# Patient Record
Sex: FEMALE | Race: BLACK OR AFRICAN AMERICAN
[De-identification: names, ages, dates, MRNs, and addresses within clinical notes are randomized per-mention and may not be internally consistent; named-entity substitution may affect disease eponyms.]

---

## 2021-03-04 ENCOUNTER — HOSPITAL ENCOUNTER (EMERGENCY)
Dept: HOSPITAL 8 - ED | Age: 31
Discharge: HOME | End: 2021-03-04
Payer: COMMERCIAL

## 2021-03-04 VITALS — WEIGHT: 132.28 LBS | HEIGHT: 67 IN | BODY MASS INDEX: 20.76 KG/M2

## 2021-03-04 VITALS — SYSTOLIC BLOOD PRESSURE: 100 MMHG | DIASTOLIC BLOOD PRESSURE: 57 MMHG

## 2021-03-04 DIAGNOSIS — O21.1: Primary | ICD-10-CM

## 2021-03-04 DIAGNOSIS — Z3A.08: ICD-10-CM

## 2021-03-04 LAB
ALBUMIN SERPL-MCNC: 4 G/DL (ref 3.4–5)
ALP SERPL-CCNC: 49 U/L (ref 45–117)
ALT SERPL-CCNC: 16 U/L (ref 12–78)
ANION GAP SERPL CALC-SCNC: 7 MMOL/L (ref 5–15)
BASOPHILS # BLD AUTO: 0 X10^3/UL (ref 0–0.1)
BASOPHILS NFR BLD AUTO: 0 % (ref 0–1)
BILIRUB SERPL-MCNC: 0.5 MG/DL (ref 0.2–1)
CALCIUM SERPL-MCNC: 8.9 MG/DL (ref 8.5–10.1)
CHLORIDE SERPL-SCNC: 104 MMOL/L (ref 98–107)
CREAT SERPL-MCNC: 0.66 MG/DL (ref 0.55–1.02)
EOSINOPHIL # BLD AUTO: 0.1 X10^3/UL (ref 0–0.4)
EOSINOPHIL NFR BLD AUTO: 2 % (ref 1–7)
ERYTHROCYTE [DISTWIDTH] IN BLOOD BY AUTOMATED COUNT: 14.4 % (ref 9.6–15.2)
LYMPHOCYTES # BLD AUTO: 1.9 X10^3/UL (ref 1–3.4)
LYMPHOCYTES NFR BLD AUTO: 23 % (ref 22–44)
MCH RBC QN AUTO: 27.7 PG (ref 27–34.8)
MCHC RBC AUTO-ENTMCNC: 32.6 G/DL (ref 32.4–35.8)
MD: NO
MICROSCOPIC: (no result)
MONOCYTES # BLD AUTO: 0.5 X10^3/UL (ref 0.2–0.8)
MONOCYTES NFR BLD AUTO: 6 % (ref 2–9)
NEUTROPHILS # BLD AUTO: 5.9 X10^3/UL (ref 1.8–6.8)
NEUTROPHILS NFR BLD AUTO: 70 % (ref 42–75)
PLATELET # BLD AUTO: 207 X10^3/UL (ref 130–400)
PMV BLD AUTO: 9.7 FL (ref 7.4–10.4)
PROT SERPL-MCNC: 7.6 G/DL (ref 6.4–8.2)
RBC # BLD AUTO: 4.3 X10^6/UL (ref 3.82–5.3)

## 2021-03-04 PROCEDURE — 84702 CHORIONIC GONADOTROPIN TEST: CPT

## 2021-03-04 PROCEDURE — 36415 COLL VENOUS BLD VENIPUNCTURE: CPT

## 2021-03-04 PROCEDURE — 81001 URINALYSIS AUTO W/SCOPE: CPT

## 2021-03-04 PROCEDURE — 87086 URINE CULTURE/COLONY COUNT: CPT

## 2021-03-04 PROCEDURE — 80053 COMPREHEN METABOLIC PANEL: CPT

## 2021-03-04 PROCEDURE — 96361 HYDRATE IV INFUSION ADD-ON: CPT

## 2021-03-04 PROCEDURE — 85025 COMPLETE CBC W/AUTO DIFF WBC: CPT

## 2021-03-04 PROCEDURE — 76801 OB US < 14 WKS SINGLE FETUS: CPT

## 2021-03-04 PROCEDURE — 86901 BLOOD TYPING SEROLOGIC RH(D): CPT

## 2021-03-04 PROCEDURE — 99285 EMERGENCY DEPT VISIT HI MDM: CPT

## 2021-03-04 PROCEDURE — 83690 ASSAY OF LIPASE: CPT

## 2021-03-04 PROCEDURE — 96374 THER/PROPH/DIAG INJ IV PUSH: CPT

## 2021-03-04 NOTE — NUR
PT MEDICATED PER EMAR. PT STATES NOT WANTING PHENERGAN SHOT AT THIS TIME. PT 
MEDICATED WITH ZOFRAN AND IV FLUIDS RUNNING AT THIS TIME

## 2021-10-05 NOTE — H&P
DATE OF ADMISSION:  10/11/2021     REASON FOR ADMISSION:  Postdates induction of labor.     HISTORY OF PRESENT ILLNESS:  This is a 31-year-old  2, para 0,   therapeutic  1 at 40 and 4/7th weeks' gestation based on last   menstrual period consistent with a 10-week ultrasound, who is electing to   proceed forward with an induction of labor with Pitocin per protocol.  Risks,   benefits and alternatives have been addressed.  She has asked appropriate   questions, signed the appropriate consents and is wishing to proceed forward   with delivery as planned.     PAST MEDICAL HISTORY:  Migraine headaches, nephrolithiasis.     PAST SURGICAL HISTORY:  Tonsillectomy in .     OBSTETRICAL HISTORY:  One therapeutic .  This is her second pregnancy.     SOCIAL HISTORY:  She denies use of any alcohol, tobacco or recreational drug   use.     MEDICATIONS:  Prenatal vitamins.     ALLERGIES:  No known drug allergies.     PHYSICAL EXAMINATION:    VITAL SIGNS:  She is afebrile, hemodynamically stable.  Current vital signs   can be seen in electronic medical record.  HEART:  Regular rate and rhythm.  CHEST:  Clear to auscultation bilaterally.  ABDOMEN:  Soft, gravid, nontender.  PELVIC:  Sterile vaginal exam 1, 25%, high and posterior.  EXTREMITIES:  Nontender.     LABORATORY DATA:  Prenatal care labs are all in order.  She is group B strep   negative.  She is also anemic, on iron sulfate, last hemoglobin and hematocrit   10.1 and 31.1.     ASSESSMENT AND PLAN:  A 31-year-old  2, para 0 at 40 and 4/7th weeks   gestation, electing to proceed forward with Pitocin per protocol.        ______________________________  MD MARY Solo/KAUSHIK/TIMOTHY    DD:  10/04/2021 18:12  DT:  10/04/2021 18:36    Job#:  [id number]

## 2021-10-09 ENCOUNTER — HOSPITAL ENCOUNTER (OUTPATIENT)
Dept: OBGYN | Facility: MEDICAL CENTER | Age: 31
End: 2021-10-09
Attending: SPECIALIST
Payer: COMMERCIAL

## 2021-10-09 PROCEDURE — U0003 INFECTIOUS AGENT DETECTION BY NUCLEIC ACID (DNA OR RNA); SEVERE ACUTE RESPIRATORY SYNDROME CORONAVIRUS 2 (SARS-COV-2) (CORONAVIRUS DISEASE [COVID-19]), AMPLIFIED PROBE TECHNIQUE, MAKING USE OF HIGH THROUGHPUT TECHNOLOGIES AS DESCRIBED BY CMS-2020-01-R: HCPCS

## 2021-10-09 PROCEDURE — U0005 INFEC AGEN DETEC AMPLI PROBE: HCPCS

## 2021-10-10 LAB
SARS-COV-2 RNA RESP QL NAA+PROBE: NOTDETECTED
SPECIMEN SOURCE: NORMAL

## 2021-10-11 ENCOUNTER — ANESTHESIA EVENT (OUTPATIENT)
Dept: ANESTHESIOLOGY | Facility: MEDICAL CENTER | Age: 31
End: 2021-10-11
Payer: COMMERCIAL

## 2021-10-11 ENCOUNTER — HOSPITAL ENCOUNTER (INPATIENT)
Facility: MEDICAL CENTER | Age: 31
LOS: 2 days | End: 2021-10-13
Attending: SPECIALIST | Admitting: SPECIALIST
Payer: COMMERCIAL

## 2021-10-11 ENCOUNTER — ANESTHESIA (OUTPATIENT)
Dept: ANESTHESIOLOGY | Facility: MEDICAL CENTER | Age: 31
End: 2021-10-11
Payer: COMMERCIAL

## 2021-10-11 LAB
BASOPHILS # BLD AUTO: 0.3 % (ref 0–1.8)
BASOPHILS # BLD: 0.05 K/UL (ref 0–0.12)
EOSINOPHIL # BLD AUTO: 0.03 K/UL (ref 0–0.51)
EOSINOPHIL NFR BLD: 0.2 % (ref 0–6.9)
ERYTHROCYTE [DISTWIDTH] IN BLOOD BY AUTOMATED COUNT: 46.6 FL (ref 35.9–50)
HCT VFR BLD AUTO: 35.4 % (ref 37–47)
HGB BLD-MCNC: 11.5 G/DL (ref 12–16)
HOLDING TUBE BB 8507: NORMAL
IMM GRANULOCYTES # BLD AUTO: 0.07 K/UL (ref 0–0.11)
IMM GRANULOCYTES NFR BLD AUTO: 0.5 % (ref 0–0.9)
LYMPHOCYTES # BLD AUTO: 1.45 K/UL (ref 1–4.8)
LYMPHOCYTES NFR BLD: 9.4 % (ref 22–41)
MCH RBC QN AUTO: 26.2 PG (ref 27–33)
MCHC RBC AUTO-ENTMCNC: 32.5 G/DL (ref 33.6–35)
MCV RBC AUTO: 80.6 FL (ref 81.4–97.8)
MONOCYTES # BLD AUTO: 0.33 K/UL (ref 0–0.85)
MONOCYTES NFR BLD AUTO: 2.1 % (ref 0–13.4)
NEUTROPHILS # BLD AUTO: 13.46 K/UL (ref 2–7.15)
NEUTROPHILS NFR BLD: 87.5 % (ref 44–72)
NRBC # BLD AUTO: 0 K/UL
NRBC BLD-RTO: 0 /100 WBC
PLATELET # BLD AUTO: 264 K/UL (ref 164–446)
PMV BLD AUTO: 11.6 FL (ref 9–12.9)
RBC # BLD AUTO: 4.39 M/UL (ref 4.2–5.4)
WBC # BLD AUTO: 15.4 K/UL (ref 4.8–10.8)

## 2021-10-11 PROCEDURE — 36415 COLL VENOUS BLD VENIPUNCTURE: CPT

## 2021-10-11 PROCEDURE — 700111 HCHG RX REV CODE 636 W/ 250 OVERRIDE (IP): Performed by: ANESTHESIOLOGY

## 2021-10-11 PROCEDURE — 700111 HCHG RX REV CODE 636 W/ 250 OVERRIDE (IP): Performed by: SPECIALIST

## 2021-10-11 PROCEDURE — 0UQMXZZ REPAIR VULVA, EXTERNAL APPROACH: ICD-10-PCS | Performed by: SPECIALIST

## 2021-10-11 PROCEDURE — 700101 HCHG RX REV CODE 250: Performed by: ANESTHESIOLOGY

## 2021-10-11 PROCEDURE — 302449 STATCHG TRIAGE ONLY (STATISTIC)

## 2021-10-11 PROCEDURE — 700105 HCHG RX REV CODE 258: Performed by: SPECIALIST

## 2021-10-11 PROCEDURE — 10907ZC DRAINAGE OF AMNIOTIC FLUID, THERAPEUTIC FROM PRODUCTS OF CONCEPTION, VIA NATURAL OR ARTIFICIAL OPENING: ICD-10-PCS | Performed by: SPECIALIST

## 2021-10-11 PROCEDURE — 304965 HCHG RECOVERY SERVICES

## 2021-10-11 PROCEDURE — 770002 HCHG ROOM/CARE - OB PRIVATE (112)

## 2021-10-11 PROCEDURE — 85025 COMPLETE CBC W/AUTO DIFF WBC: CPT

## 2021-10-11 PROCEDURE — 59409 OBSTETRICAL CARE: CPT

## 2021-10-11 RX ORDER — MISOPROSTOL 200 UG/1
800 TABLET ORAL
Status: DISCONTINUED | OUTPATIENT
Start: 2021-10-11 | End: 2021-10-12 | Stop reason: HOSPADM

## 2021-10-11 RX ORDER — ALUMINA, MAGNESIA, AND SIMETHICONE 2400; 2400; 240 MG/30ML; MG/30ML; MG/30ML
30 SUSPENSION ORAL EVERY 6 HOURS PRN
Status: DISCONTINUED | OUTPATIENT
Start: 2021-10-11 | End: 2021-10-12 | Stop reason: HOSPADM

## 2021-10-11 RX ORDER — ONDANSETRON 4 MG/1
4 TABLET, ORALLY DISINTEGRATING ORAL EVERY 6 HOURS PRN
Status: DISCONTINUED | OUTPATIENT
Start: 2021-10-11 | End: 2021-10-13 | Stop reason: HOSPADM

## 2021-10-11 RX ORDER — LIDOCAINE HYDROCHLORIDE AND EPINEPHRINE 15; 5 MG/ML; UG/ML
INJECTION, SOLUTION EPIDURAL PRN
Status: DISCONTINUED | OUTPATIENT
Start: 2021-10-11 | End: 2021-10-11 | Stop reason: SURG

## 2021-10-11 RX ORDER — ONDANSETRON 2 MG/ML
4 INJECTION INTRAMUSCULAR; INTRAVENOUS EVERY 6 HOURS PRN
Status: DISCONTINUED | OUTPATIENT
Start: 2021-10-11 | End: 2021-10-13 | Stop reason: HOSPADM

## 2021-10-11 RX ORDER — ROPIVACAINE HYDROCHLORIDE 2 MG/ML
INJECTION, SOLUTION EPIDURAL; INFILTRATION PRN
Status: DISCONTINUED | OUTPATIENT
Start: 2021-10-11 | End: 2021-10-12 | Stop reason: HOSPADM

## 2021-10-11 RX ORDER — DEXTROSE, SODIUM CHLORIDE, SODIUM LACTATE, POTASSIUM CHLORIDE, AND CALCIUM CHLORIDE 5; .6; .31; .03; .02 G/100ML; G/100ML; G/100ML; G/100ML; G/100ML
INJECTION, SOLUTION INTRAVENOUS CONTINUOUS
Status: DISCONTINUED | OUTPATIENT
Start: 2021-10-11 | End: 2021-10-13 | Stop reason: HOSPADM

## 2021-10-11 RX ORDER — SODIUM CHLORIDE, SODIUM LACTATE, POTASSIUM CHLORIDE, CALCIUM CHLORIDE 600; 310; 30; 20 MG/100ML; MG/100ML; MG/100ML; MG/100ML
INJECTION, SOLUTION INTRAVENOUS CONTINUOUS
Status: ACTIVE | OUTPATIENT
Start: 2021-10-11 | End: 2021-10-11

## 2021-10-11 RX ORDER — SODIUM CHLORIDE, SODIUM LACTATE, POTASSIUM CHLORIDE, AND CALCIUM CHLORIDE .6; .31; .03; .02 G/100ML; G/100ML; G/100ML; G/100ML
250 INJECTION, SOLUTION INTRAVENOUS PRN
Status: DISCONTINUED | OUTPATIENT
Start: 2021-10-11 | End: 2021-10-12 | Stop reason: HOSPADM

## 2021-10-11 RX ORDER — ROPIVACAINE HYDROCHLORIDE 2 MG/ML
INJECTION, SOLUTION EPIDURAL; INFILTRATION; PERINEURAL CONTINUOUS
Status: DISCONTINUED | OUTPATIENT
Start: 2021-10-11 | End: 2021-10-11

## 2021-10-11 RX ORDER — ROPIVACAINE HYDROCHLORIDE 2 MG/ML
INJECTION, SOLUTION EPIDURAL; INFILTRATION; PERINEURAL CONTINUOUS
Status: DISCONTINUED | OUTPATIENT
Start: 2021-10-11 | End: 2021-10-12

## 2021-10-11 RX ORDER — SODIUM CHLORIDE, SODIUM LACTATE, POTASSIUM CHLORIDE, AND CALCIUM CHLORIDE .6; .31; .03; .02 G/100ML; G/100ML; G/100ML; G/100ML
1000 INJECTION, SOLUTION INTRAVENOUS
Status: DISCONTINUED | OUTPATIENT
Start: 2021-10-11 | End: 2021-10-12 | Stop reason: HOSPADM

## 2021-10-11 RX ADMIN — LIDOCAINE HYDROCHLORIDE,EPINEPHRINE BITARTRATE 5 ML: 15; .005 INJECTION, SOLUTION EPIDURAL; INFILTRATION; INTRACAUDAL; PERINEURAL at 17:18

## 2021-10-11 RX ADMIN — ROPIVACAINE HYDROCHLORIDE: 2 INJECTION, SOLUTION EPIDURAL; INFILTRATION at 17:21

## 2021-10-11 RX ADMIN — SODIUM CHLORIDE, POTASSIUM CHLORIDE, SODIUM LACTATE AND CALCIUM CHLORIDE: 600; 310; 30; 20 INJECTION, SOLUTION INTRAVENOUS at 16:05

## 2021-10-11 RX ADMIN — OXYTOCIN 125 ML/HR: 10 INJECTION, SOLUTION INTRAMUSCULAR; INTRAVENOUS at 23:40

## 2021-10-11 RX ADMIN — ROPIVACAINE HYDROCHLORIDE 10 ML: 2 INJECTION, SOLUTION EPIDURAL; INFILTRATION at 17:21

## 2021-10-11 RX ADMIN — OXYTOCIN 2 MILLI-UNITS/MIN: 10 INJECTION, SOLUTION INTRAMUSCULAR; INTRAVENOUS at 19:37

## 2021-10-11 ASSESSMENT — COPD QUESTIONNAIRES
COPD SCREENING SCORE: 0
HAVE YOU SMOKED AT LEAST 100 CIGARETTES IN YOUR ENTIRE LIFE: NO/DON'T KNOW
DO YOU EVER COUGH UP ANY MUCUS OR PHLEGM?: NO/ONLY WITH OCCASIONAL COLDS OR INFECTIONS
IN THE PAST 12 MONTHS DO YOU DO LESS THAN YOU USED TO BECAUSE OF YOUR BREATHING PROBLEMS: DISAGREE/UNSURE
DURING THE PAST 4 WEEKS HOW MUCH DID YOU FEEL SHORT OF BREATH: NONE/LITTLE OF THE TIME

## 2021-10-11 ASSESSMENT — LIFESTYLE VARIABLES
TOTAL SCORE: 0
HAVE PEOPLE ANNOYED YOU BY CRITICIZING YOUR DRINKING: NO
EVER_SMOKED: NEVER
HAVE YOU EVER FELT YOU SHOULD CUT DOWN ON YOUR DRINKING: NO
TOTAL SCORE: 0
DOES PATIENT WANT TO STOP DRINKING: NO
CONSUMPTION TOTAL: INCOMPLETE
EVER FELT BAD OR GUILTY ABOUT YOUR DRINKING: NO
EVER HAD A DRINK FIRST THING IN THE MORNING TO STEADY YOUR NERVES TO GET RID OF A HANGOVER: NO
ALCOHOL_USE: NO
TOTAL SCORE: 0

## 2021-10-11 ASSESSMENT — PAIN DESCRIPTION - PAIN TYPE
TYPE: ACUTE PAIN
TYPE: ACUTE PAIN

## 2021-10-11 ASSESSMENT — PATIENT HEALTH QUESTIONNAIRE - PHQ9
2. FEELING DOWN, DEPRESSED, IRRITABLE, OR HOPELESS: NOT AT ALL
SUM OF ALL RESPONSES TO PHQ9 QUESTIONS 1 AND 2: 0
1. LITTLE INTEREST OR PLEASURE IN DOING THINGS: NOT AT ALL

## 2021-10-11 NOTE — PROGRESS NOTES
Patient comes in with concerns that she is having contractions.  She denies leaking or bleeding and she feels fetal movement.  SVE is 4/90/-2.  Dr Chacon called.  Patient to be rechecked in an hour.  Patient rechecked and is 5/100/-2 with a bbow.  Dr Chacon called.  Patient to be admitted.  Report given to Kinza SALAS.

## 2021-10-11 NOTE — ANESTHESIA PREPROCEDURE EVALUATION
Relevant Problems   No relevant active problems   Term pregnancy, labor pain    Physical Exam    Airway   Mallampati: II  TM distance: >3 FB  Neck ROM: full       Cardiovascular - normal exam  Rhythm: regular  Rate: normal  (-) murmur     Dental - normal exam           Pulmonary - normal exam  Breath sounds clear to auscultation     Abdominal    Neurological - normal exam                 Anesthesia Plan    ASA 2       Plan - epidural   Neuraxial block will be labor analgesia                  Pertinent diagnostic labs and testing reviewed    Informed Consent:    Anesthetic plan and risks discussed with patient.

## 2021-10-12 LAB
ERYTHROCYTE [DISTWIDTH] IN BLOOD BY AUTOMATED COUNT: 47.3 FL (ref 35.9–50)
HCT VFR BLD AUTO: 31.8 % (ref 37–47)
HGB BLD-MCNC: 9.9 G/DL (ref 12–16)
MCH RBC QN AUTO: 25.6 PG (ref 27–33)
MCHC RBC AUTO-ENTMCNC: 31.1 G/DL (ref 33.6–35)
MCV RBC AUTO: 82.2 FL (ref 81.4–97.8)
PLATELET # BLD AUTO: 219 K/UL (ref 164–446)
PMV BLD AUTO: 11.9 FL (ref 9–12.9)
RBC # BLD AUTO: 3.87 M/UL (ref 4.2–5.4)
WBC # BLD AUTO: 14.4 K/UL (ref 4.8–10.8)

## 2021-10-12 PROCEDURE — 770002 HCHG ROOM/CARE - OB PRIVATE (112)

## 2021-10-12 PROCEDURE — 700102 HCHG RX REV CODE 250 W/ 637 OVERRIDE(OP): Performed by: SPECIALIST

## 2021-10-12 PROCEDURE — A9270 NON-COVERED ITEM OR SERVICE: HCPCS | Performed by: SPECIALIST

## 2021-10-12 PROCEDURE — 36415 COLL VENOUS BLD VENIPUNCTURE: CPT

## 2021-10-12 PROCEDURE — 85027 COMPLETE CBC AUTOMATED: CPT

## 2021-10-12 PROCEDURE — 303615 HCHG EPIDURAL/SPINAL ANESTHESIA FOR LABOR

## 2021-10-12 RX ORDER — BISACODYL 10 MG
10 SUPPOSITORY, RECTAL RECTAL PRN
Status: DISCONTINUED | OUTPATIENT
Start: 2021-10-12 | End: 2021-10-13 | Stop reason: HOSPADM

## 2021-10-12 RX ORDER — IBUPROFEN 600 MG/1
600 TABLET ORAL EVERY 6 HOURS PRN
Status: DISCONTINUED | OUTPATIENT
Start: 2021-10-12 | End: 2021-10-13 | Stop reason: HOSPADM

## 2021-10-12 RX ORDER — VITAMIN A ACETATE, BETA CAROTENE, ASCORBIC ACID, CHOLECALCIFEROL, .ALPHA.-TOCOPHEROL ACETATE, DL-, THIAMINE MONONITRATE, RIBOFLAVIN, NIACINAMIDE, PYRIDOXINE HYDROCHLORIDE, FOLIC ACID, CYANOCOBALAMIN, CALCIUM CARBONATE, FERROUS FUMARATE, ZINC OXIDE, CUPRIC OXIDE 3080; 12; 120; 400; 1; 1.84; 3; 20; 22; 920; 25; 200; 27; 10; 2 [IU]/1; UG/1; MG/1; [IU]/1; MG/1; MG/1; MG/1; MG/1; MG/1; [IU]/1; MG/1; MG/1; MG/1; MG/1; MG/1
1 TABLET, FILM COATED ORAL
Status: DISCONTINUED | OUTPATIENT
Start: 2021-10-12 | End: 2021-10-13 | Stop reason: HOSPADM

## 2021-10-12 RX ORDER — OXYCODONE HYDROCHLORIDE AND ACETAMINOPHEN 5; 325 MG/1; MG/1
1 TABLET ORAL EVERY 4 HOURS PRN
Status: DISCONTINUED | OUTPATIENT
Start: 2021-10-12 | End: 2021-10-13 | Stop reason: HOSPADM

## 2021-10-12 RX ORDER — CARBOPROST TROMETHAMINE 250 UG/ML
250 INJECTION, SOLUTION INTRAMUSCULAR
Status: DISCONTINUED | OUTPATIENT
Start: 2021-10-12 | End: 2021-10-13 | Stop reason: HOSPADM

## 2021-10-12 RX ORDER — SODIUM CHLORIDE, SODIUM LACTATE, POTASSIUM CHLORIDE, CALCIUM CHLORIDE 600; 310; 30; 20 MG/100ML; MG/100ML; MG/100ML; MG/100ML
INJECTION, SOLUTION INTRAVENOUS PRN
Status: DISCONTINUED | OUTPATIENT
Start: 2021-10-12 | End: 2021-10-13 | Stop reason: HOSPADM

## 2021-10-12 RX ORDER — METHYLERGONOVINE MALEATE 0.2 MG/ML
0.2 INJECTION INTRAVENOUS
Status: DISCONTINUED | OUTPATIENT
Start: 2021-10-12 | End: 2021-10-13 | Stop reason: HOSPADM

## 2021-10-12 RX ORDER — DOCUSATE SODIUM 100 MG/1
100 CAPSULE, LIQUID FILLED ORAL 2 TIMES DAILY PRN
Status: DISCONTINUED | OUTPATIENT
Start: 2021-10-12 | End: 2021-10-13 | Stop reason: HOSPADM

## 2021-10-12 RX ORDER — MISOPROSTOL 200 UG/1
600 TABLET ORAL
Status: DISCONTINUED | OUTPATIENT
Start: 2021-10-12 | End: 2021-10-13 | Stop reason: HOSPADM

## 2021-10-12 RX ADMIN — IBUPROFEN 600 MG: 600 TABLET ORAL at 08:27

## 2021-10-12 RX ADMIN — IBUPROFEN 600 MG: 600 TABLET ORAL at 01:13

## 2021-10-12 RX ADMIN — OXYCODONE HYDROCHLORIDE AND ACETAMINOPHEN 1 TABLET: 5; 325 TABLET ORAL at 19:27

## 2021-10-12 RX ADMIN — OXYCODONE HYDROCHLORIDE AND ACETAMINOPHEN 1 TABLET: 5; 325 TABLET ORAL at 11:01

## 2021-10-12 RX ADMIN — IBUPROFEN 600 MG: 600 TABLET ORAL at 19:27

## 2021-10-12 RX ADMIN — PRENATAL WITH FERROUS FUM AND FOLIC ACID 1 TABLET: 3080; 920; 120; 400; 22; 1.84; 3; 20; 10; 1; 12; 200; 27; 25; 2 TABLET ORAL at 08:26

## 2021-10-12 ASSESSMENT — PAIN DESCRIPTION - PAIN TYPE
TYPE: ACUTE PAIN

## 2021-10-12 ASSESSMENT — PAIN SCALES - GENERAL: PAIN_LEVEL: 0

## 2021-10-12 NOTE — PROGRESS NOTES
Progress Note    Subjective:   Doing well. Comfortable with the PALLAVI    Objective Data:  Recent Labs     10/11/21  1605   WBC 15.4*   RBC 4.39   HEMOGLOBIN 11.5*   HEMATOCRIT 35.4*   MCV 80.6*   MCH 26.2*   MCHC 32.5*   RDW 46.6   PLATELETCT 264   MPV 11.6           Vitals:    10/11/21 1726 10/11/21 1727 10/11/21 1729 10/11/21 1735   BP:  116/71  108/66   Pulse: 92 84 92 89   Resp:       Temp:       TempSrc:       SpO2:   100%    Weight:       Height:         ABdomen: soft gravid non tender  SVE: 9cm/C/+1  Vtx  Ext: non tender calves    FHTs: 140s with GBTBV during exam  [place]: q 2 min   No intake or output data in the 24 hours ending 10/11/21 2105    Current Facility-Administered Medications   Medication Dose Route Frequency Provider Last Rate Last Admin   • LR infusion   Intravenous Continuous Miky Ferguson M.D. 125 mL/hr at 10/11/21 1605 New Bag at 10/11/21 1605   • fentaNYL (SUBLIMAZE) injection 50 mcg  50 mcg Intravenous Q HOUR PRN Miky Ferguson M.D.       • D5LR infusion   Intravenous Continuous Miky Ferguson M.D.       • fentaNYL (SUBLIMAZE) injection 100 mcg  100 mcg Intravenous Q HOUR PRN Miky Ferguson M.D.       • mag hydrox-al hydrox-simeth (MAALOX PLUS ES or MYLANTA DS) suspension 30 mL  30 mL Oral Q6HRS PRN Miky Ferguson M.D.       • ondansetron (ZOFRAN ODT) dispertab 4 mg  4 mg Oral Q6HRS PRN Miky Ferguson M.D.        Or   • ondansetron (ZOFRAN) syringe/vial injection 4 mg  4 mg Intravenous Q6HRS PRN Miky Ferguson M.D.       • miSOPROStol (CYTOTEC) tablet 800 mcg  800 mcg Rectal Once PRN Miky Ferguson M.D.       • lactated ringers (BOLUS) infusion  1,000 mL Intravenous Once PRN James Dutton M.D.       • ropivacaine 0.2 % (NAROPIN) injection   Epidural Continuous James Dutton M.D.       • ePHEDrine injection 5 mg  5 mg Intravenous Q5 MIN PRN James Dutton M.D.        And   • lactated ringers infusion (BOLUS)  250 mL Intravenous PRN James Dutton M.D.       • oxytocin  (PITOCIN) 20 UNITS/1000ML LR (induction of labor)  0.5-20 nancy-units/min Intravenous Continuous Miky Ferguson M.D. 6 mL/hr at 10/11/21 1937 2 nancy-units/min at 10/11/21 193     Facility-Administered Medications Ordered in Other Encounters   Medication Dose Route Frequency Provider Last Rate Last Admin   • lidocaine-epinephrine 1.5 %-1:[id number] injection   Epidural PRN James Dutton M.D.   5 mL at 10/11/21 1718   • ropivacaine (NAROPIN) injection   Epidural PRN James Dutton M.D.   10 mL at 10/11/21 1721       A/P 30 yo  at term with overall reassuring fetal status now wishing to proceed with Pitocin augmentation after counseling given protracted labor course. All questions were answered.

## 2021-10-12 NOTE — ANESTHESIA TIME REPORT
Anesthesia Start and Stop Event Times     Date Time Event    10/11/2021 1710 Ready for Procedure     1712 Anesthesia Start     2238 Anesthesia Stop        Responsible Staff  10/11/21    Name Role Begin End    James Dutton M.D. Anesth 1712 2238        Preop Diagnosis (Free Text):  Pre-op Diagnosis             Preop Diagnosis (Codes):    Premium Reason  A. 3PM - 7AM    Comments:

## 2021-10-12 NOTE — PROGRESS NOTES
Report received from JOSUE Encarnacion. Assumed care of patient. Patient currently breastfeeding infant skin to skin in bed. Family at bedside.     0054- Patient transferred to  in wheelchair with infant in arms. Patient mother Leatha accompanying with belongings. Report given to JOSUE Ramsay.

## 2021-10-12 NOTE — PROGRESS NOTES
Received bedside report from JOSUE Travis, pt care assumed. VSS, pt assessment complete. Pt AAOx4,  c/o 3/10 pain at this time. POC discussed with pt and verbalizes no questions. Pt denies any additional needs at this time. Bed locked and in lowest position. Pt educated on fall risk and verbalized understanding, call light within reach, hourly rounding initiated.

## 2021-10-12 NOTE — OR SURGEON
Immediate Delivery Note        Estimated Blood Loss: 200 ccs    Findings:  over bilateral first degree labial lacerations with tight nuchal cord reduced on the perineum with easy delivery of the shoulders and body with Apgars of 8/9 at one and five minutes respectively in this vigorous female infant with the placenta delivered meconium stained intact with 3vc with the labial lacerations repaired with 3.0 Vicryl with single interrupted sutures.    Complications: None        10/11/2021 10:53 PM Miky Ferguson M.D.

## 2021-10-12 NOTE — PROGRESS NOTES
1900 Report received from Laura SALAS    190 Dr. Ferguson at bedside. AROM. SVE /+1. Orders received to start pitocin     Provider updated to fht and inability to start pitocin     Provider updated to fht. Orders received to keep pitocin off and recheck cervix in 1 hr     provider called and updated to sve. Orders received to start pushing    2224 provider called for delivery    2238  of viable female infant.    2245  of placenta. Pitocin bolus infusing per provider order.    2330 Report given to Angy SALAS

## 2021-10-12 NOTE — L&D DELIVERY NOTE
DATE OF SERVICE:  10/11/2021     Briefly, this is a 31-year-old  2, para 0, therapeutic  1 at 40   and 2/7 weeks' gestation based on last menstrual period consistent with a   10-week ultrasound, electing to proceed forward with admission after she made   cervical change from 4 cm to 5 cm dilation.  She was admitted, was granted   continuous lumbar epidural to optimize pain management, did undergo an   artificial rupture of membranes with light meconium noted.  She continued to   do progress well and arrived at 9 cm, did have somewhat of a protracted labor.    Pitocin augmentation was initially started at 2 nancy-international units,   however, was not well tolerated by the fetus; however, she continued to make   cervical change.  She arrived at complete, complete and +2 to +3, pushed for a   short period of time, subsequently underwent a spontaneous vaginal delivery   over first-degree bilateral labial lacerations with a tight nuchal cord   reduced on the perineum with ease delivery of the shoulders and body.  Cord   was clamped and cut.  Infant was handed to waiting nursing staff.  Apgars were   8 and 9 at one and five minutes respectively in this vigorous female infant.    Placenta was delivered spontaneous and intact, slightly meconium stained with   3-vessel cord.  Bilateral labial lacerations were repaired with 3-0 Vicryl   single interrupted sutures.  Estimated blood loss for the delivery was 200 mL.    The patient tolerated labor, delivery and repair well.        ______________________________  MD MARY Solo/MAICOL    DD:  10/11/2021 22:57  DT:  10/11/2021 23:12    Job#:  [id number]

## 2021-10-12 NOTE — PROGRESS NOTES
1550: Assumed care of pt. AAO, pt ambulated to labor room, POC discussed, pt verbalized understanding.

## 2021-10-12 NOTE — DISCHARGE PLANNING
Discharge Planning Assessment Post Partum     Reason for Referral:  Consult-history of THC  Address: [address] [place] [address] [place] [address]  Phone: [phone]  Mom Diagnosis: Pregnancy  Baby Diagnosis: [place]-40.3 weeks  Primary Language: English     Name of Baby: Jim Mederos (: 10/11/21)  Father of the Baby: Not involved  Involved in baby’s care? No  Contact Information: N/A     Prenatal Care: Yes-Dr. Fergusno  PCP for new baby: Dr. Mohan     Support System: MOB stated she has a lot of family support   Coping/Bonding between mother & baby: Yes  Source of Feeding: breast feeding  Supplies for Infant: prepared for infant; denies any needs     Mom's Insurance: [employer] and Medicaid  Baby Covered on Insurance:Yes  Mother Employed/School: -[employer]  Other children in the home/names & ages: No, first baby     Financial Hardship/Income: No   Mom's Mental status: alert and oriented  Services used prior to admit: Medicaid     CPS History: No  Psychiatric History: No  Domestic Violence History: No  Drug/ETOH History: history of THC-last use was 2020.  Infant's UDS is negative     Resources Provided: post partum support and counseling resources and a children and family resource list  Referrals Made: diaper bank referral provided      Clearance for Discharge: Infant's cleared to discharge home with mother

## 2021-10-12 NOTE — PROGRESS NOTES
Assessment done vital signs stable. Patient progressing according to plan of care. Fundus firm at the umbilicus with light lochia. Patient up voiding without difficulty. Ambulating with steady gait. Claims to have good pain relief with p.o medications. Breast feeding infant on demand. Family at bedside. Pt claims she will call for medications or any needs

## 2021-10-12 NOTE — ANESTHESIA PROCEDURE NOTES
Epidural Block    Date/Time: 10/11/2021 5:12 PM  Performed by: James Dutton M.D.  Authorized by: James Dutton M.D.     Patient Location:  OB  Start Time:  10/11/2021 5:12 PM  End Time:  10/11/2021 5:18 PM  Reason for Block: labor analgesia    patient identified, IV checked, site marked, risks and benefits discussed, surgical consent, monitors and equipment checked, pre-op evaluation and timeout performed    Patient Position:  Sitting  Prep: ChloraPrep, patient draped and sterile technique    Monitoring:  Blood pressure, continuous pulse oximetry and heart rate  Approach:  Midline  Location:  L3-L4  Injection Technique:  RAFA saline  Skin infiltration:  Lidocaine  Strength:  1%  Dose:  2ml  Needle Type:  Tuohy  Needle Gauge:  17 G  Needle Length:  3.5 in  Loss of resistance::  4  Catheter Size:  19 G  Catheter at Skin Depth:  10  Test Dose Result:  Negative

## 2021-10-12 NOTE — PROGRESS NOTES
Progress Note    Subjective:   Doing well. Comfortable with PALLAVI.     Objective Data:  Recent Labs     10/11/21  1605   WBC 15.4*   RBC 4.39   HEMOGLOBIN 11.5*   HEMATOCRIT 35.4*   MCV 80.6*   MCH 26.2*   MCHC 32.5*   RDW 46.6   PLATELETCT 264   MPV 11.6           Vitals:    10/11/21 1726 10/11/21 1727 10/11/21 1729 10/11/21 1735   BP:  116/71  108/66   Pulse: 92 84 92 89   Resp:       Temp:       TempSrc:       SpO2:   100%    Weight:       Height:         Abdomen: soft gravid non tender  SVE: 8cm/ C/ +1 Vtx AROM light meconium  Ext: non tender calves    FHTs: 140s with GBTBV with scalp stim at exam  Hydro: irregular but frequent uterine contractions    No intake or output data in the 24 hours ending 10/11/21 1948    Current Facility-Administered Medications   Medication Dose Route Frequency Provider Last Rate Last Admin   • LR infusion   Intravenous Continuous Miky Ferguson M.D. 125 mL/hr at 10/11/21 1605 New Bag at 10/11/21 1605   • fentaNYL (SUBLIMAZE) injection 50 mcg  50 mcg Intravenous Q HOUR PRN Miky Ferguson M.D.       • D5LR infusion   Intravenous Continuous Miky Ferguson M.D.       • fentaNYL (SUBLIMAZE) injection 100 mcg  100 mcg Intravenous Q HOUR PRN Miky Ferguson M.D.       • mag hydrox-al hydrox-simeth (MAALOX PLUS ES or MYLANTA DS) suspension 30 mL  30 mL Oral Q6HRS PRN Miky Ferguson M.D.       • ondansetron (ZOFRAN ODT) dispertab 4 mg  4 mg Oral Q6HRS PRN Miky Ferguson M.D.        Or   • ondansetron (ZOFRAN) syringe/vial injection 4 mg  4 mg Intravenous Q6HRS PRN iMky Ferguson M.D.       • miSOPROStol (CYTOTEC) tablet 800 mcg  800 mcg Rectal Once PRN Miky Ferguson M.D.       • lactated ringers (BOLUS) infusion  1,000 mL Intravenous Once PRN James Dutton M.D.       • ropivacaine 0.2 % (NAROPIN) injection   Epidural Continuous James Dutton M.D.       • ePHEDrine injection 5 mg  5 mg Intravenous Q5 MIN PRN James Dutton M.D.        And   • lactated ringers infusion  (BOLUS)  250 mL Intravenous PRN James Dutton M.D.       • oxytocin (PITOCIN) 20 UNITS/1000ML LR (induction of labor)  0.5-20 nancy-units/min Intravenous Continuous Miky Ferguson M.D. 6 mL/hr at 10/11/21 1937 2 nancy-units/min at 10/11/21 1937     Facility-Administered Medications Ordered in Other Encounters   Medication Dose Route Frequency Provider Last Rate Last Admin   • lidocaine-epinephrine 1.5 %-1:235530 injection   Epidural PRN James Dutton M.D.   5 mL at 10/11/21 1718   • ropivacaine (NAROPIN) injection   Epidural PRN James Dutton M.D.   10 mL at 10/11/21 1721       A/P 32 yo  at 40 2/7 weeks gestation with overall reassuring fetal status now s/p AROM with light meconium. Will start Pitocin per protocol. Anticipate .

## 2021-10-12 NOTE — H&P
DATE OF ADMISSION:  10/11/2021     ADDENDUM     The patient is a 31-year-old  2, para 0, therapeutic  1 at 40   and 2/7th weeks' gestation based on last menstrual period consistent with a   10-week ultrasound, who was scheduled for an induction on 10/13/2021; however,   presented to labor and delivery with complaints of frequent regular painful   uterine contractions.  She denied any leakage of fluid or vaginal bleeding.    She was examined and was found to be 4, 90%, -2 station.  She was reexamined 1   hour later, was found to be 5 complete, -2 with a bulging bag of water.    Overall, reassuring fetal status was noted.  The patient is group B strep   negative and will proceed forward with expected management and anticipated   spontaneous vaginal delivery.        ______________________________  MD MARY Solo/KAUSHIK    DD:  10/11/2021 19:48  DT:  10/11/2021 20:09    Job#:  [id number]

## 2021-10-12 NOTE — DISCHARGE SUMMARY
DATE OF ADMISSION:  10/11/2021   DATE OF DISCHARGE:  10/12/2021     DISCHARGE DIAGNOSES:  1.  Status post a spontaneous vaginal delivery.  2.  Uncomplicated postpartum course.     HISTORY OF PRESENT ILLNESS:  This is a 31-year-old  2, para 0,   therapeutic  1, at 40 and 2/7 weeks' gestation based on last menstrual   period, consistent with a 7-week ultrasound, who presented in active labor   with evidence of cervical change from 4 cm to 5 cm dilation.  Overall,   reassuring fetal status was noted.  She is group B strep negative.     PAST MEDICAL HISTORY:  Can be found in dictated history and physical.     PHYSICAL EXAMINATION: Can be found in dictated history and physical.     ASSESSMENT AND PLAN:  A 31-year-old  2, para 0 at term with overall   reassuring fetal status, now in active labor.     HOSPITAL COURSE:  As stated above, the patient was admitted.  She progressed   well until arriving at 9 cm, did have a slightly protracted labor course.    Pitocin augmentation was started; however, was not tolerated well by the   fetus, was discontinued; however, she continued to make change and did   subsequently undergo a spontaneous vaginal delivery.  Apgars were 8 and 9 at 1   and 5 minutes respectively in this vigorous female infant.  Her postpartum   course was unremarkable and on postpartum day #1, she was ambulating, voiding   well, tolerating a regular diet.  She was breastfeeding well.  She had very   minimal lochia rubra that was felt to be appropriate for discharge.     DISCHARGE PLAN:  Follow up in 6 weeks.     DISCHARGE INSTRUCTIONS:  She is to call with any increased temperature greater   than 100.4, increasing vaginal bleeding, abdominal pain unrelieved with any   p.o. pain medication, call with any other questions or concerns.     DISCHARGE MEDICATIONS:  Motrin.        ______________________________  MD MARY Solo/CLAUDIA    DD:  10/12/2021 07:43  DT:  10/12/2021  08:14    Job#:  [id number]

## 2021-10-12 NOTE — PROGRESS NOTES
Progress Note    Subjective:   Doing well. No issues or concerns. No sig bleeding or discharge. BF well.    Objective Data:  Recent Labs     10/11/21  1605 10/12/21  0650   WBC 15.4* 14.4*   RBC 4.39 3.87*   HEMOGLOBIN 11.5* 9.9*   HEMATOCRIT 35.4* 31.8*   MCV 80.6* 82.2   MCH 26.2* 25.6*   MCHC 32.5* 31.1*   RDW 46.6 47.3   PLATELETCT 264 219   MPV 11.6 11.9           Vitals:    10/11/21 2330 10/11/21 2345 10/12/21 0110 10/12/21 0600   BP: 122/78 117/69 121/79 116/74   Pulse: (!) 101 92 94 75   Resp:   16 16   Temp:   35.9 °C (96.6 °F) 36.4 °C (97.5 °F)   TempSrc:   Temporal Temporal   SpO2:   96% 99%   Weight:       Height:         Abdomen: soft non tender fundus at umbilicus  Perineum: no sig bleeding or discharge  Ext: non tender calves    Intake/Output Summary (Last 24 hours) at 10/12/2021 0739  Last data filed at 10/11/2021 2238  Gross per 24 hour   Intake --   Output 200 ml   Net -200 ml       Current Facility-Administered Medications   Medication Dose Route Frequency Provider Last Rate Last Admin   • ibuprofen (MOTRIN) tablet 600 mg  600 mg Oral Q6HRS PRN Miky Ferguson M.D.   600 mg at 10/12/21 0113   • oxyCODONE-acetaminophen (PERCOCET) 5-325 MG per tablet 1 Tablet  1 Tablet Oral Q4HRS PRN Miky Ferguson M.D.       • LR infusion   Intravenous PRN Miky Ferguson M.D.       • measles, mumps and rubella vaccine (MMR) injection 0.5 mL  0.5 mL Subcutaneous Once PRN Miky Ferguson M.D.       • PRN oxytocin (PITOCIN) (20 Units/1000 mL) PRN for excessive uterine bleeding - See Admin Instr  125-999 mL/hr Intravenous Once PRN Miky Ferguson M.D.       • miSOPROStol (CYTOTEC) tablet 600 mcg  600 mcg Rectal Once PRN Miky Ferguson M.D.       • methylergonovine (METHERGINE) injection 0.2 mg  0.2 mg Intramuscular Once PRN Miky Ferguson M.D.       • carboPROST (HEMABATE) injection 250 mcg  250 mcg Intramuscular Once PRN Miky Ferguson M.D.       • docusate sodium (COLACE) capsule 100 mg  100 mg Oral BID  PRN Miky Ferguson M.D.       • bisacodyl (DULCOLAX) suppository 10 mg  10 mg Rectal PRN Miky Ferguson M.D.       • magnesium hydroxide (MILK OF MAGNESIA) suspension 30 mL  30 mL Oral Q6HRS PRN Miky Ferguson M.D.       • prenatal plus vitamin (STUARTNATAL 1+1) 27-1 MG tablet 1 Tablet  1 Tablet Oral Daily-0800 Miky Ferguson M.D.       • D5LR infusion   Intravenous Continuous Miky Ferguson M.D.       • ondansetron (ZOFRAN ODT) dispertab 4 mg  4 mg Oral Q6HRS PRN Miky Ferguson M.D.        Or   • ondansetron (ZOFRAN) syringe/vial injection 4 mg  4 mg Intravenous Q6HRS PRN Miky Ferguson M.D.       • oxytocin (PITOCIN) 20 UNITS/1000ML LR (induction of labor)  0.5-20 nancy-units/min Intravenous Continuous Miky Ferguson M.D.   Stopped at 10/11/21 1940   • oxytocin (PITOCIN) 20 UNITS/1000ML LR (postpartum)   mL/hr Intravenous Continuous Miky Ferguson M.D. 125 mL/hr at 10/11/21 2340 125 mL/hr at 10/11/21 2340   • oxytocin (PITOCIN) 20 UNITS/1000ML LR (postpartum)  2,000 mL/hr Intravenous Once Miky Ferguson M.D. 2,000 mL/hr at 10/11/21 2250 2,000 mL/hr at 10/11/21 2250       A/P S/P  now PPD #1. Plan to proceed with usual pp management and probable pm discharge if continues to meet discharge criteria. All questions were answered.

## 2021-10-12 NOTE — LACTATION NOTE
This note was copied from a baby's chart.  I assisted mother to turn baby's body towards her and bring into better alignment, which resulted in a more comfortable latch. Encouraged her to ask for assistance as needed today.

## 2021-10-12 NOTE — ANESTHESIA POSTPROCEDURE EVALUATION
Patient: Kristin Mederos    Procedure Summary     Date: 10/11/21 Room / Location:     Anesthesia Start: 1712 Anesthesia Stop: 2238    Procedure: Labor Epidural Diagnosis:     Scheduled Providers:  Responsible Provider: James Dutton M.D.    Anesthesia Type: epidural ASA Status: 2          Final Anesthesia Type: epidural  Last vitals  BP   Blood Pressure: 117/69    Temp   36.8 °C (98.2 °F)    Pulse   92   Resp   16    SpO2   100 %      Anesthesia Post Evaluation    Patient location during evaluation: PACU  Patient participation: complete - patient participated  Level of consciousness: awake and alert  Pain score: 0    Airway patency: patent  Anesthetic complications: no  Cardiovascular status: hemodynamically stable  Respiratory status: acceptable  Hydration status: euvolemic    PONV: none          No complications documented.     Nurse Pain Score: 0 (NPRS)

## 2021-10-12 NOTE — PROGRESS NOTES
1740-Report received, care assumed.   1810-Room prepped for delivery  1900-Report given to Alysha SALAS

## 2021-10-13 VITALS
HEIGHT: 67 IN | TEMPERATURE: 97.9 F | RESPIRATION RATE: 16 BRPM | BODY MASS INDEX: 26.06 KG/M2 | HEART RATE: 60 BPM | OXYGEN SATURATION: 99 % | DIASTOLIC BLOOD PRESSURE: 56 MMHG | SYSTOLIC BLOOD PRESSURE: 94 MMHG | WEIGHT: 166 LBS

## 2021-10-13 PROCEDURE — 700102 HCHG RX REV CODE 250 W/ 637 OVERRIDE(OP): Performed by: SPECIALIST

## 2021-10-13 PROCEDURE — A9270 NON-COVERED ITEM OR SERVICE: HCPCS | Performed by: SPECIALIST

## 2021-10-13 RX ADMIN — OXYCODONE HYDROCHLORIDE AND ACETAMINOPHEN 1 TABLET: 5; 325 TABLET ORAL at 00:20

## 2021-10-13 RX ADMIN — DOCUSATE SODIUM 100 MG: 100 CAPSULE ORAL at 06:47

## 2021-10-13 RX ADMIN — IBUPROFEN 600 MG: 600 TABLET ORAL at 02:19

## 2021-10-13 RX ADMIN — PRENATAL WITH FERROUS FUM AND FOLIC ACID 1 TABLET: 3080; 920; 120; 400; 22; 1.84; 3; 20; 10; 1; 12; 200; 27; 25; 2 TABLET ORAL at 09:22

## 2021-10-13 RX ADMIN — OXYCODONE HYDROCHLORIDE AND ACETAMINOPHEN 1 TABLET: 5; 325 TABLET ORAL at 06:47

## 2021-10-13 ASSESSMENT — EDINBURGH POSTNATAL DEPRESSION SCALE (EPDS)
THINGS HAVE BEEN GETTING ON TOP OF ME: NO, I HAVE BEEN COPING AS WELL AS EVER
I HAVE FELT SCARED OR PANICKY FOR NO GOOD REASON: NO, NOT AT ALL
I HAVE BEEN SO UNHAPPY THAT I HAVE BEEN CRYING: NO, NEVER
I HAVE BEEN ABLE TO LAUGH AND SEE THE FUNNY SIDE OF THINGS: AS MUCH AS I ALWAYS COULD
I HAVE BEEN ANXIOUS OR WORRIED FOR NO GOOD REASON: NO, NOT AT ALL
I HAVE BEEN SO UNHAPPY THAT I HAVE HAD DIFFICULTY SLEEPING: NOT AT ALL
I HAVE FELT SAD OR MISERABLE: NO, NOT AT ALL
THE THOUGHT OF HARMING MYSELF HAS OCCURRED TO ME: NEVER
I HAVE LOOKED FORWARD WITH ENJOYMENT TO THINGS: AS MUCH AS I EVER DID
I HAVE BLAMED MYSELF UNNECESSARILY WHEN THINGS WENT WRONG: NO, NEVER

## 2021-10-13 ASSESSMENT — PAIN DESCRIPTION - PAIN TYPE
TYPE: ACUTE PAIN
TYPE: ACUTE PAIN

## 2021-10-13 NOTE — PROGRESS NOTES
Progress Note    Subjective:   Doing well. No issues or concerns. No sig bleeding. BF well.    Objective Data:  Recent Labs     10/11/21  1605 10/12/21  0650   WBC 15.4* 14.4*   RBC 4.39 3.87*   HEMOGLOBIN 11.5* 9.9*   HEMATOCRIT 35.4* 31.8*   MCV 80.6* 82.2   MCH 26.2* 25.6*   MCHC 32.5* 31.1*   RDW 46.6 47.3   PLATELETCT 264 219   MPV 11.6 11.9           Vitals:    10/12/21 1400 10/12/21 1739 10/12/21 2200 10/13/21 0600   BP: 103/71 (!) 99/60 100/67 (!) 94/56   Pulse: 75 82 84 60   Resp: 20 18 16 16   Temp: 36.6 °C (97.8 °F) 37.3 °C (99.2 °F) 36.3 °C (97.4 °F) 36.6 °C (97.9 °F)   TempSrc: Temporal Temporal Temporal Temporal   SpO2: 96% 100% 99% 99%   Weight:       Height:         Abdomen: soft non tender fundus at umbilicus  Perineum: no sig bleeding or discharge on pad  Ext: non tender calves    No intake or output data in the 24 hours ending 10/13/21 0808    Current Facility-Administered Medications   Medication Dose Route Frequency Provider Last Rate Last Admin   • ibuprofen (MOTRIN) tablet 600 mg  600 mg Oral Q6HRS PRN Miky Ferguson M.D.   600 mg at 10/13/21 0219   • oxyCODONE-acetaminophen (PERCOCET) 5-325 MG per tablet 1 Tablet  1 Tablet Oral Q4HRS PRN Miky Ferguson M.D.   1 Tablet at 10/13/21 0647   • LR infusion   Intravenous PRN Miky Ferguson M.D.       • measles, mumps and rubella vaccine (MMR) injection 0.5 mL  0.5 mL Subcutaneous Once PRN Miky Ferguson M.D.       • PRN oxytocin (PITOCIN) (20 Units/1000 mL) PRN for excessive uterine bleeding - See Admin Instr  125-999 mL/hr Intravenous Once PRN Miky Ferguson M.D.       • miSOPROStol (CYTOTEC) tablet 600 mcg  600 mcg Rectal Once PRN Miky Ferguson M.D.       • methylergonovine (METHERGINE) injection 0.2 mg  0.2 mg Intramuscular Once PRN Miky Ferguson M.D.       • carboPROST (HEMABATE) injection 250 mcg  250 mcg Intramuscular Once PRN Miky Ferguson M.D.       • docusate sodium (COLACE) capsule 100 mg  100 mg Oral BID PRN Miky DAVE  ELENITA Ferguson   100 mg at 10/13/21 0647   • bisacodyl (DULCOLAX) suppository 10 mg  10 mg Rectal PRN Miky Ferguson M.D.       • magnesium hydroxide (MILK OF MAGNESIA) suspension 30 mL  30 mL Oral Q6HRS PRN Miky Ferguson M.D.       • prenatal plus vitamin (STUARTNATAL 1+1) 27-1 MG tablet 1 Tablet  1 Tablet Oral Daily-0800 Miky Ferguson M.D.   1 Tablet at 10/12/21 0826   • D5LR infusion   Intravenous Continuous Miky Ferguson M.D.       • ondansetron (ZOFRAN ODT) dispertab 4 mg  4 mg Oral Q6HRS PRN Miky Ferguson M.D.        Or   • ondansetron (ZOFRAN) syringe/vial injection 4 mg  4 mg Intravenous Q6HRS PRN Miky Ferguson M.D.       • oxytocin (PITOCIN) 20 UNITS/1000ML LR (induction of labor)  0.5-20 nancy-units/min Intravenous Continuous Miky Ferguson M.D.   Stopped at 10/11/21 1940   • oxytocin (PITOCIN) 20 UNITS/1000ML LR (postpartum)   mL/hr Intravenous Continuous Miky Ferguson M.D. 125 mL/hr at 10/11/21 2340 125 mL/hr at 10/11/21 2340       A/P S/P  now PPD #2. Doing well and will discharge home today with f/u in 6 weeks. Discharge instructions given.

## 2021-10-13 NOTE — LACTATION NOTE
Mother reports that she is breastfeeding her  without difficulty or discomfort. Resources for out-patient support discussed.Out-patient follow up resource guide provided.

## 2021-10-13 NOTE — CARE PLAN
The patient is Stable - Low risk of patient condition declining or worsening    Shift Goals  Clinical Goals: lochia wdl    Progress made toward(s) clinical / shift goals: FF@U with light lochia    Patient is not progressing towards the following goals:

## 2021-10-13 NOTE — PROGRESS NOTES
Received bedside report from RN Jose Luis, pt care assumed. VSS, pt assessment complete. Pt AAOx4, c/o 7/10 pain at this time. POC discussed with pt and verbalizes no questions. Pt denies any additional needs at this time. Bed locked and in lowest position. Pt educated on fall risk and verbalized understanding, call light within reach, hourly rounding initiated.

## 2021-10-13 NOTE — DISCHARGE SUMMARY
DATE OF ADMISSION:  10/11/2021   DATE OF DISCHARGE:  10/13/2021     ADDENDUM     Briefly, this is a 31-year-old  2, para 0, therapeutic  1 at 40   and 2/7 weeks' gestation who presented in active labor, did undergo a   spontaneous vaginal delivery and on postpartum day #1, had met all discharge   criteria and had wished to go home; however, just prior to discharge, the   pediatricians felt that they wished to observe the baby overnight and thus the   patient elected to proceed forward with observation again for an additional   evening. On postpartum day #2, again, she had met all discharge criteria and   was scheduled to follow up after 6 weeks.  Discharge medications had been   dispersed to the pharmacy.  Discharge medications were given. Please in the   previously dictated discharge summary for any additional details.        ______________________________  MD MARY Solo/CLAUDIA    DD:  10/13/2021 08:11  DT:  10/13/2021 13:15    Job#:  [id number]

## 2021-10-13 NOTE — DISCHARGE INSTRUCTIONS
PATIENT DISCHARGE EDUCATION INSTRUCTION SHEET  REASONS TO CALL YOUR OBSTETRICIAN  · Persistent fever, shaking, chills (Temperature higher than 100.4) may indicate you have an infection  · Heavy bleeding: soaking more than 1 pad per hour; Passing clots an egg-sized clot or bigger may mean you have an postpartum hemorrhage  · Foul odor from vagina or bad smelling or discolored discharge or blood  · Breast infection (Mastitis symptoms); breast pain, chills, fever, redness or red streaks, may feel flu like symptoms  · Urinary pain, burning or frequency  · Incision that is not healing, increased redness, swelling, tenderness or pain, or any pus from episiotomy or  site may mean you have an infection  · Redness, swelling, warmth, or painful to touch in the calf area of your leg may mean you have a blood clot  · Severe or intensified depression, thoughts or feelings of wanting to hurt yourself or someone else   · Pain in chest, obstructed breathing or shortness of breath (trouble catching your breath) may mean you are having a postpartum complication. Call your provider immediately   · Headache that does not get better, even after taking medicine, a bad headache with vision changes or pain in the upper right area of your belly may mean you have high blood pressure or post birth preeclampsia. Call your provider immediately    HAND WASHING  All family and friends should wash their hands:  · Before and after holding the baby  · Before feeding the baby  · After using the restroom or changing the baby's diaper    WOUND CARE  Ask your physician for additional care instructions. In general:  ·  Incision:  · May shower and pat incision dry   · Keep the incision clean and dry  · There should not be any opening or pus from the incision  · Continue to walk at home 3 times a day   · Do NOT lift anything heavier than your baby (over 10 pounds)  · Encourage family to help participate in care of the  to allow  rest and mom time to heal  · Episiotomy/Laceration  · May use glenn-spray bottle, witch hazel pads and dermaplast spray for comfort  · Use glenn-spray bottle after urinating to cleanse perineal area  · To prevent burning during urination spray glenn-water bottle on labial area   · Pat perineal area dry until episiotomy/laceration is healed  · Continue to use glenn-bottle until bleeding stops as needed  · If have a 2nd degree laceration or greater, a Sitz bath can offer relief from soreness, burning, and inflammation   · Sitz Bath   · Sit in 6 inches of warm water and soak laceration as needed until the laceration heals    VAGINAL CARE AND BLEEDING  · Nothing inside vagina for 6 weeks:   · No sexual intercourse, tampons or douching  · Bleeding may continue for 2-4 weeks. Amount and color may vary  · Soaking 1 pad or more in an hour for several hours is considered heavy bleeding  · Passing large egg sized blood clots can be concerning  · If you feel like you have heavy bleeding or are having increasing amount of blood clots call your Obstetrician immediately  · If you begin feeling faint upon standing, feeling sick to your stomach, have clammy skin, a really fast heartbeat, have chills, start feeling confused, dizzy, sleepy or weak, or feeling like you're going to faint call your Obstetrician immediately    HYPERTENSION   Preeclampsia or gestational hypertension are types of high blood pressure that only pregnant women can get. It is important for you to be aware of symptoms to seek early intervention and treatment. If you have any of these symptoms immediately call your Obstetrician    · Vision changes or blurred vision   · Severe headache or pain that is unrelieved with medication and will not go away  · Persistent pain in upper abdomen or shoulder   · Increased swelling of face, feet, or hands  · Difficulty breathing or shortness of breath at rest  · Urinating less than usual    URINATION AND BOWEL MOVEMENTS  · Eating  "more fiber (bran cereal, fruits, and vegetables) and drinking plenty of fluids will help to avoid constipation  · Urinary frequency and urgency after childbirth is normal  · If you experience any urinary pain, burning or frequency call your provider    BIRTH CONTROL  · It is possible to become pregnant at any time after delivery and while breastfeeding  · Plan to discuss a method of birth control with your physician at your post delivery follow up visit    POSTPARTUM BLUES  During the first few days after birth, you may experience a sense of the \"blues\" which may include impatience, irritability or even crying. These feelings come and go quickly. However, as many as 1 in 10 women experience emotional symptoms known as postpartum depression.     POSTPARTUM DEPRESSION    May start as early as the second or third day after delivery or take several weeks or months to develop. Symptoms of \"blues\" are present, but are more intense: Crying spells; loss of appetite; feelings of hopelessness or loss of control; fear of touching the baby; over concern or no concern at all about the baby; little or no concern about your own appearance/caring for yourself; and/or inability to sleep or excessive sleeping. Contact your Obstetrician if you are experiencing any of these symptoms     PREVENTING SHAKEN BABY  If you are angry or stressed, PUT THE BABY IN THE CRIB, step away, take some deep breaths, and wait until you are calm to care for the baby. DO NOT SHAKE THE BABY. You are not alone, call a supporter for help.  · [facility] 24/7 crisis call line (179-673-5659) or (1-720.173.8160)  · You can also text them, text \"ANSWER\" (280087)      "